# Patient Record
Sex: MALE | Race: AMERICAN INDIAN OR ALASKA NATIVE
[De-identification: names, ages, dates, MRNs, and addresses within clinical notes are randomized per-mention and may not be internally consistent; named-entity substitution may affect disease eponyms.]

---

## 2019-01-18 ENCOUNTER — HOSPITAL ENCOUNTER (EMERGENCY)
Dept: HOSPITAL 14 - H.ER | Age: 49
Discharge: HOME | End: 2019-01-18
Payer: COMMERCIAL

## 2019-01-18 VITALS — RESPIRATION RATE: 18 BRPM | OXYGEN SATURATION: 97 %

## 2019-01-18 DIAGNOSIS — Z86.79: ICD-10-CM

## 2019-01-18 DIAGNOSIS — I10: ICD-10-CM

## 2019-01-18 DIAGNOSIS — Z88.0: ICD-10-CM

## 2019-01-18 DIAGNOSIS — K62.5: Primary | ICD-10-CM

## 2019-01-18 PROCEDURE — 86850 RBC ANTIBODY SCREEN: CPT

## 2019-01-18 PROCEDURE — 85027 COMPLETE CBC AUTOMATED: CPT

## 2019-01-18 PROCEDURE — 80048 BASIC METABOLIC PNL TOTAL CA: CPT

## 2019-01-18 PROCEDURE — 86900 BLOOD TYPING SEROLOGIC ABO: CPT

## 2019-01-18 PROCEDURE — 99283 EMERGENCY DEPT VISIT LOW MDM: CPT

## 2019-01-19 VITALS — DIASTOLIC BLOOD PRESSURE: 94 MMHG | SYSTOLIC BLOOD PRESSURE: 138 MMHG | HEART RATE: 84 BPM | TEMPERATURE: 98.6 F

## 2019-01-19 LAB
BUN SERPL-MCNC: 20 MG/DL (ref 9–20)
CALCIUM SERPL-MCNC: 9.4 MG/DL (ref 8.4–10.2)
ERYTHROCYTE [DISTWIDTH] IN BLOOD BY AUTOMATED COUNT: 15.1 % (ref 11.5–14.5)
ERYTHROCYTE [DISTWIDTH] IN BLOOD BY AUTOMATED COUNT: 15.3 % (ref 11.5–14.5)
GFR NON-AFRICAN AMERICAN: > 60
HGB BLD-MCNC: 12.5 G/DL (ref 12–18)
HGB BLD-MCNC: 13.7 G/DL (ref 12–18)
MCH RBC QN AUTO: 26.3 PG (ref 27–31)
MCH RBC QN AUTO: 26.4 PG (ref 27–31)
MCHC RBC AUTO-ENTMCNC: 32.9 G/DL (ref 33–37)
MCHC RBC AUTO-ENTMCNC: 33.3 G/DL (ref 33–37)
MCV RBC AUTO: 79.4 FL (ref 80–94)
MCV RBC AUTO: 79.8 FL (ref 80–94)
PLATELET # BLD: 228 K/UL (ref 130–400)
PLATELET # BLD: 239 K/UL (ref 130–400)
RBC # BLD AUTO: 4.76 MIL/UL (ref 4.4–5.9)
RBC # BLD AUTO: 5.19 MIL/UL (ref 4.4–5.9)
WBC # BLD AUTO: 5.1 K/UL (ref 4.8–10.8)
WBC # BLD AUTO: 5.1 K/UL (ref 4.8–10.8)

## 2019-01-19 NOTE — ED PDOC
HPI: Male  Pain


Time Seen by Provider: 01/18/19 23:00


Chief Complaint (Nursing): GI Problem


Chief Complaint (Provider): Rectal Bleeding


History Per: Patient


History/Exam Limitations: no limitations


Onset/Duration Of Symptoms: Hrs


Current Symptoms Are (Timing): Still Present


Additional Complaint(s): 


47 y/o male with a PMHx of HTN and an Aortic Abdominal Aneurysm with repair 

presents to the ED for evaluation of rectal bleeding, onset 3 hours prior to 

arrival. Patient reports of having 3 episodes of bright red rectal bleeding 

measuring at about half a cup since about 9:00 PM. Patient states when symptoms 

occurred, he felt his blood pressure rise and began feeling lightheaded. 

Otherwise, patient denies abdominal pain, constipation, straining of stool, 

chest pain and shortness of breath.





PMD: Carluccio and Jetsos





Past Medical History


Reviewed: Historical Data, Nursing Documentation, Vital Signs


Vital Signs: 





                                Last Vital Signs











Temp  98.9 F   01/18/19 22:43


 


Pulse  90   01/18/19 22:43


 


Resp  18   01/18/19 22:43


 


BP  163/114 H  01/18/19 22:43


 


Pulse Ox  97   01/18/19 22:43














- Medical History


PMH: HTN


   Denies: Chronic Kidney Disease


Other PMH: Arotic Abdominal Aneurysm with repair





- Surgical History


Other surgeries: AAA repair





- Family History


Family History: States: Unknown Family Hx





- Allergies


Allergies/Adverse Reactions: 


                                    Allergies











Allergy/AdvReac Type Severity Reaction Status Date / Time


 


Penicillins Allergy  RASH Verified 01/18/19 22:42














Review of Systems


ROS Statement: Except As Marked, All Systems Reviewed And Found Negative


Cardiovascular: Negative for: Chest Pain


Respiratory: Negative for: Shortness of Breath


Gastrointestinal: Negative for: Abdominal Pain, Constipation, Other (straining 

of stool )


Genitourinary Male: Positive for: Other (Rectal Bleeding)





Physical Exam





- Reviewed


Nursing Documentation Reviewed: Yes


Vital Signs Reviewed: Yes





- Physical Exam


Appears: Positive for: Well


Head Exam: Positive for: ATRAUMATIC, NORMOCEPHALIC


Skin: Positive for: Normal Color, Warm, Dry


Eye Exam: Positive for: Normal appearance, EOMI, PERRL


Neck: Positive for: Normal, Painless ROM


Cardiovascular/Chest: Positive for: Regular Rate, Rhythm.  Negative for: Murmur


Respiratory: Positive for: Normal Breath Sounds.  Negative for: Respiratory 

Distress


Gastrointestinal/Abdominal: Positive for: Normal Exam, Soft.  Negative for: 

Tenderness


Rectal: Positive for: Other (ED RN Vick Molina acted as chaperone.).  Negative

for: Black Stool (Stool is light brown)


Extremity: Positive for: Normal ROM.  Negative for: Deformity


Neurologic/Psych: Positive for: Alert, Oriented.  Negative for: Motor/Sensory 

Deficits





- Laboratory Results


Result Diagrams: 


                                 01/19/19 02:03





                                 01/18/19 23:46





- ECG


O2 Sat by Pulse Oximetry: 97 (RA)


Pulse Ox Interpretation: Normal





Medical Decision Making


Medical Decision Making: 


Time: 2335


A/P: 47 y/o male with a PMHx of HTN and Abdominal Aortic Aneurysm presenting 

with rectal bleeding. 


-- Patient is very well appearing, hemodynamically stable. 


-- Differentials include but not limited to hemorrhoids, angiodysplasia and 

diverticulosis


-- Will monitor patient for further management. 


-- Will do CBC (x2) and keep patient on monitor. 





-- Type and Screen


-- BMP


-- CBC with differentials





Time: 0041


Plan:


-- Occult Blood, Stool, ER





Time: 0300


-- Patient reports an improvement of symptoms. Patient is stable for discharge 

home with a diagnosis of rectal bleeding. Return parameters provided. Patient 

advised to follow up with PMD for further management. 





_________

________________________________________________________________________________


________________________


Scribe Attestation:


Documented by Jossie Quintana, acting as a scribe for Jerome Thorpe MD.





Provider Scribe Attestation:


All medical record entries made by the Scribe were at my direction and 

personally dictated by me. I have reviewed the chart and agree that the record 

accurately reflects my personal performance of the history, physical exam, 

medical decision making, and the department course for this patient. I have also

personally directed, reviewed, and agree with the discharge instructions and 

disposition





Disposition





- Clinical Impression


Clinical Impression: 


 Rectal bleeding








- Patient ED Disposition


Is Patient to be Admitted: No


Counseled Patient/Family Regarding: Studies Performed, Diagnosis, Need For 

Followup, Rx Given





- Disposition


Referrals: 


Corey Wright MD [Family Provider] - 


Disposition: Routine/Home


Disposition Time: 03:00


Condition: IMPROVED


Instructions:  Bloody Stools


Forms:  [facility] (English)